# Patient Record
Sex: FEMALE | URBAN - METROPOLITAN AREA
[De-identification: names, ages, dates, MRNs, and addresses within clinical notes are randomized per-mention and may not be internally consistent; named-entity substitution may affect disease eponyms.]

---

## 2023-01-19 PROBLEM — Z00.00 ENCOUNTER FOR PREVENTIVE HEALTH EXAMINATION: Status: ACTIVE | Noted: 2023-01-19

## 2023-02-01 ENCOUNTER — NEW REFERRAL (OUTPATIENT)
Dept: URBAN - METROPOLITAN AREA CLINIC 16 | Facility: CLINIC | Age: 33
End: 2023-02-01

## 2023-02-01 DIAGNOSIS — D86.9: ICD-10-CM

## 2023-02-01 DIAGNOSIS — H43.89: ICD-10-CM

## 2023-02-01 DIAGNOSIS — H43.393: ICD-10-CM

## 2023-02-01 DIAGNOSIS — H44.113: ICD-10-CM

## 2023-02-01 PROCEDURE — 92134 CPTRZ OPH DX IMG PST SGM RTA: CPT

## 2023-02-01 PROCEDURE — 92202 OPSCPY EXTND ON/MAC DRAW: CPT

## 2023-02-01 PROCEDURE — 99204 OFFICE O/P NEW MOD 45 MIN: CPT

## 2023-02-01 ASSESSMENT — TONOMETRY
OD_IOP_MMHG: 6
OS_IOP_MMHG: 6

## 2023-02-01 ASSESSMENT — VISUAL ACUITY
OS_PH: 20/25+2
OD_SC: 20/50
OD_PH: 20/25-1
OS_SC: 20/60-1

## 2023-02-08 ENCOUNTER — FOLLOW UP (OUTPATIENT)
Dept: URBAN - METROPOLITAN AREA CLINIC 16 | Facility: CLINIC | Age: 33
End: 2023-02-08

## 2023-02-08 DIAGNOSIS — H44.113: ICD-10-CM

## 2023-02-08 DIAGNOSIS — D86.9: ICD-10-CM

## 2023-02-08 DIAGNOSIS — H43.89: ICD-10-CM

## 2023-02-08 PROCEDURE — 92235 FLUORESCEIN ANGRPH MLTIFRAME: CPT

## 2023-02-08 PROCEDURE — 92250 FUNDUS PHOTOGRAPHY W/I&R: CPT

## 2023-02-08 PROCEDURE — 92499 UNLISTED OPH SVC/PROCEDURE: CPT

## 2023-02-08 PROCEDURE — 92202 OPSCPY EXTND ON/MAC DRAW: CPT

## 2023-02-08 PROCEDURE — 92014 COMPRE OPH EXAM EST PT 1/>: CPT

## 2023-02-08 ASSESSMENT — VISUAL ACUITY
OD_CC: 20/60-1
OD_PH: 20/25-1
OS_PH: 20/25-1
OS_CC: 20/70-1

## 2023-02-08 ASSESSMENT — TONOMETRY
OS_IOP_MMHG: 11
OD_IOP_MMHG: 10

## 2023-02-22 ENCOUNTER — APPOINTMENT (OUTPATIENT)
Dept: OTOLARYNGOLOGY | Facility: CLINIC | Age: 33
End: 2023-02-22
Payer: SELF-PAY

## 2023-02-22 DIAGNOSIS — H90.3 SENSORINEURAL HEARING LOSS, BILATERAL: ICD-10-CM

## 2023-02-22 DIAGNOSIS — H91.93 UNSPECIFIED HEARING LOSS, BILATERAL: ICD-10-CM

## 2023-02-22 DIAGNOSIS — H83.8X3 OTHER SPECIFIED DISEASES OF INNER EAR, BILATERAL: ICD-10-CM

## 2023-02-22 DIAGNOSIS — F17.200 NICOTINE DEPENDENCE, UNSPECIFIED, UNCOMPLICATED: ICD-10-CM

## 2023-02-22 DIAGNOSIS — Z78.9 OTHER SPECIFIED HEALTH STATUS: ICD-10-CM

## 2023-02-22 PROCEDURE — 92584 ELECTROCOCHLEOGRAPHY: CPT

## 2023-02-22 PROCEDURE — 92567 TYMPANOMETRY: CPT

## 2023-02-22 PROCEDURE — 99244 OFF/OP CNSLTJ NEW/EST MOD 40: CPT

## 2023-02-22 PROCEDURE — 92557 COMPREHENSIVE HEARING TEST: CPT

## 2023-02-22 RX ORDER — METHOTREXATE 2.5 MG/1
TABLET ORAL
Refills: 0 | Status: ACTIVE | COMMUNITY

## 2023-02-22 RX ORDER — ADALIMUMAB 20MG/0.4ML
KIT SUBCUTANEOUS
Refills: 0 | Status: ACTIVE | COMMUNITY

## 2023-02-22 RX ORDER — CHROMIUM 200 MCG
TABLET ORAL
Refills: 0 | Status: ACTIVE | COMMUNITY

## 2023-02-22 RX ORDER — PREDNISONE 10 MG
TABLET ORAL
Refills: 0 | Status: ACTIVE | COMMUNITY

## 2023-02-23 ENCOUNTER — TRANSCRIPTION ENCOUNTER (OUTPATIENT)
Age: 33
End: 2023-02-23

## 2023-02-23 ENCOUNTER — NON-APPOINTMENT (OUTPATIENT)
Age: 33
End: 2023-02-23

## 2023-02-23 LAB
CRP SERPL-MCNC: 6 MG/L
ERYTHROCYTE [SEDIMENTATION RATE] IN BLOOD BY WESTERGREN METHOD: 44 MM/HR
RHEUMATOID FACT SER QL: 52 IU/ML

## 2023-03-08 ENCOUNTER — FOLLOW UP (OUTPATIENT)
Dept: URBAN - METROPOLITAN AREA CLINIC 16 | Facility: CLINIC | Age: 33
End: 2023-03-08

## 2023-03-08 DIAGNOSIS — D86.9: ICD-10-CM

## 2023-03-08 DIAGNOSIS — H44.113: ICD-10-CM

## 2023-03-08 PROCEDURE — 92134 CPTRZ OPH DX IMG PST SGM RTA: CPT

## 2023-03-08 PROCEDURE — 92202 OPSCPY EXTND ON/MAC DRAW: CPT

## 2023-03-08 PROCEDURE — 92014 COMPRE OPH EXAM EST PT 1/>: CPT

## 2023-03-08 ASSESSMENT — VISUAL ACUITY
OD_CC: 20/20
OS_CC: 20/20

## 2023-03-08 ASSESSMENT — TONOMETRY: OS_IOP_MMHG: 7

## 2023-03-14 NOTE — DATA REVIEWED
[de-identified] : ESR 71; crp 86; 2/2/23; CBC normal; ; CRP in jan 62; B27 neg, quntiferon neg, abdulkadir neg, SSA/SSB neg, TSH low, lyme neg -  [de-identified] : Right - mild to profound sensorineural hearing loss\par Left - moderate to profound sensorineural hearing loss\par Impedance testing reveals normal Type A tympanograms bilaterally\par Ecog\par Right - \par Left - \par  [de-identified] : MRI  [de-identified] : ABR threshold 100dB

## 2023-03-14 NOTE — HISTORY OF PRESENT ILLNESS
[de-identified] : 32 year old female referred by Dr. Gomes, ENT, for bilateral hearing loss.  States about 3 months ago had blurry vision, then lost vision, a few days later lost hearing in right ear.  hospitals this occurred 2 years ago, vision returned and left ear hearing never returned.  States has been on prednisone with no improvement in hearing.  hospitals was told she has an autoimmune disorder.  Currently on methotrexate and humira. States hearing has not improved.  Has been mtx x 1 week 10mg - and on humira x 1 week - currently on 40mg prednisone - was on 60mg and IT AD - no change - had MRI 3 months ago   - father muscluar dystrophy - no autoimmune diseases on moms side -  - also vertigo - no meds for vertigo - vertigo doews not always coincide with HL - no skin rashes but bad joint pain prior to prednisone - RHEUMATOLOGIST - [phone] Dr. Wu

## 2023-03-14 NOTE — REASON FOR VISIT
[Initial Consultation] : an initial consultation for [FreeTextEntry2] : referred by Dr. Gomes, ENT, for bilateral hearing loss

## 2023-03-14 NOTE — CONSULT LETTER
[Dear  ___] : Dear  [unfilled], [Consult Letter:] : I had the pleasure of evaluating your patient, [unfilled]. [Please see my note below.] : Please see my note below. [Consult Closing:] : Thank you very much for allowing me to participate in the care of this patient.  If you have any questions, please do not hesitate to contact me. [Sincerely,] : Sincerely, [FreeTextEntry2] : Saul Gomes MD [FreeTextEntry3] : Brandon Corona MD, FACS\par Chair of Otolaryngology, [facility]\par Professor of Otolaryngology & Molecular Medicine, [employer] at [facility]\par

## 2023-03-21 ENCOUNTER — NON-APPOINTMENT (OUTPATIENT)
Age: 33
End: 2023-03-21

## (undated) RX ORDER — DUREZOL 0.5 MG/ML: 1 EMULSION OPHTHALMIC

## (undated) RX ORDER — PREDNISOLONE ACETATE 10 MG/ML: 1 SUSPENSION/ DROPS OPHTHALMIC

## (undated) RX ORDER — CYCLOSPORINE 0.5 MG/ML: 1 EMULSION OPHTHALMIC TWICE A DAY